# Patient Record
Sex: MALE | Race: WHITE | Employment: FULL TIME | URBAN - METROPOLITAN AREA
[De-identification: names, ages, dates, MRNs, and addresses within clinical notes are randomized per-mention and may not be internally consistent; named-entity substitution may affect disease eponyms.]

---

## 2024-03-20 ENCOUNTER — OFFICE VISIT (OUTPATIENT)
Age: 34
End: 2024-03-20

## 2024-03-20 VITALS
TEMPERATURE: 98.5 F | WEIGHT: 192 LBS | OXYGEN SATURATION: 100 % | SYSTOLIC BLOOD PRESSURE: 132 MMHG | RESPIRATION RATE: 18 BRPM | HEART RATE: 80 BPM | DIASTOLIC BLOOD PRESSURE: 87 MMHG

## 2024-03-20 DIAGNOSIS — J20.9 ACUTE BRONCHITIS WITH BRONCHOSPASM: Primary | ICD-10-CM

## 2024-03-20 RX ORDER — ALBUTEROL SULFATE 90 UG/1
2 AEROSOL, METERED RESPIRATORY (INHALATION) 4 TIMES DAILY PRN
Qty: 18 G | Refills: 0 | Status: SHIPPED | OUTPATIENT
Start: 2024-03-20

## 2024-03-20 RX ORDER — DEXTROMETHORPHAN HYDROBROMIDE AND PROMETHAZINE HYDROCHLORIDE 15; 6.25 MG/5ML; MG/5ML
5 SYRUP ORAL NIGHTLY PRN
Qty: 50 ML | Refills: 0 | Status: SHIPPED | OUTPATIENT
Start: 2024-03-20

## 2024-03-20 RX ORDER — PREDNISONE 10 MG/1
TABLET ORAL
Qty: 21 EACH | Refills: 0 | Status: SHIPPED | OUTPATIENT
Start: 2024-03-20

## 2024-03-20 RX ORDER — BENZONATATE 200 MG/1
200 CAPSULE ORAL 3 TIMES DAILY PRN
Qty: 30 CAPSULE | Refills: 0 | Status: SHIPPED | OUTPATIENT
Start: 2024-03-20

## 2024-03-20 ASSESSMENT — ENCOUNTER SYMPTOMS
CHEST TIGHTNESS: 1
WHEEZING: 1
SHORTNESS OF BREATH: 0
COUGH: 1
HEMOPTYSIS: 0
SORE THROAT: 1
SWOLLEN GLANDS: 0
SPUTUM PRODUCTION: 0

## 2024-03-20 NOTE — PROGRESS NOTES
Pulses: Normal pulses.      Heart sounds: Normal heart sounds.   Pulmonary:      Effort: No tachypnea, accessory muscle usage, prolonged expiration or respiratory distress.      Breath sounds: No decreased air movement. Decreased breath sounds, wheezing (forced expi) and rhonchi present. No rales.   Abdominal:      Tenderness: There is no abdominal tenderness.   Lymphadenopathy:      Cervical: No cervical adenopathy.   Neurological:      Mental Status: He is alert.          1. Acute bronchitis with bronchospasm  -     benzonatate (TESSALON) 200 MG capsule; Take 1 capsule by mouth 3 times daily as needed for Cough, Disp-30 capsule, R-0Normal  -     predniSONE 10 MG (21) TBPK; Use as directed, Disp-21 each, R-0Normal  -     promethazine-dextromethorphan (PROMETHAZINE-DM) 6.25-15 MG/5ML syrup; Take 5 mLs by mouth nightly as needed for Cough, Disp-50 mL, R-0Normal  -     albuterol sulfate HFA (VENTOLIN HFA) 108 (90 Base) MCG/ACT inhaler; Inhale 2 puffs into the lungs 4 times daily as needed for Wheezing, Disp-18 g, R-0Normal   Allegra 180 mg with Flonase    Use Mucinex DM twice a day      No results found for any visits on 03/20/24.  The patients condition was discussed with the patient and they understand.  The patient is to follow up with primary care doctor.  If signs and symptoms become worse the pt is to go to the ER. The patient is to take medications as prescribed.